# Patient Record
Sex: FEMALE | Race: WHITE | NOT HISPANIC OR LATINO | URBAN - METROPOLITAN AREA
[De-identification: names, ages, dates, MRNs, and addresses within clinical notes are randomized per-mention and may not be internally consistent; named-entity substitution may affect disease eponyms.]

---

## 2019-03-14 ENCOUNTER — OFFICE VISIT (OUTPATIENT)
Dept: OBSTETRICS AND GYNECOLOGY | Facility: CLINIC | Age: 24
End: 2019-03-14

## 2019-03-14 VITALS
BODY MASS INDEX: 45.71 KG/M2 | DIASTOLIC BLOOD PRESSURE: 68 MMHG | HEIGHT: 63 IN | WEIGHT: 258 LBS | SYSTOLIC BLOOD PRESSURE: 120 MMHG

## 2019-03-14 DIAGNOSIS — E66.01 CLASS 3 SEVERE OBESITY DUE TO EXCESS CALORIES WITHOUT SERIOUS COMORBIDITY WITH BODY MASS INDEX (BMI) OF 45.0 TO 49.9 IN ADULT (HCC): Primary | ICD-10-CM

## 2019-03-14 PROBLEM — E66.813 CLASS 3 SEVERE OBESITY WITHOUT SERIOUS COMORBIDITY WITH BODY MASS INDEX (BMI) OF 45.0 TO 49.9 IN ADULT: Status: ACTIVE | Noted: 2019-03-14

## 2019-03-14 PROCEDURE — 99204 OFFICE O/P NEW MOD 45 MIN: CPT | Performed by: OBSTETRICS & GYNECOLOGY

## 2019-03-14 RX ORDER — FLUOXETINE HYDROCHLORIDE 40 MG/1
CAPSULE ORAL
COMMUNITY
Start: 2019-03-09

## 2019-03-14 RX ORDER — HYDROXYZINE PAMOATE 25 MG/1
25 CAPSULE ORAL CONTINUOUS PRN
COMMUNITY

## 2019-03-14 NOTE — PROGRESS NOTES
Subjective     Chief Complaint   Patient presents with   • Gynecologic Exam     Patient states she was told by [facility] that she has symptoms of PCOS.  Patient had recent pap smear in February (normal).  Patient states  menses short at end of pill pack.  On ocp x 1 year.  Menses more regular since start of ocp's        Ashely Serrano is a 23 y.o. year old  presenting to be seen for consultation.    Her LMP was Patient's last menstrual period was 2019 (approximate)..  Her cycles are regular, predictable and consistent every 28 - 32 days.  Usually her flow is typically normal with no more than 0 days of heavy flow each menses.   Additionally she describes no other symptoms associated with her menses.    She is sexually active.  In the past 12 months there have not been new sexual partners.  Condoms are not typically used.  She would not like to be screened for STD's at today's exam.     Current contraceptive methods being used: OCP (estrogen/progesterone).  In the coming year, she is not considering changing her current contraceptive method.    She exercises regularly: no.  She wears her seat belt: not asked.  She has concerns about domestic violence: no.    GYN screening history:  · Last pap: she reports her last PAP was normal.    This patient is concerned about polycystic ovarian syndrome.  She had menarche at age 13.  And has had times in her life where she would have regular menstrual cycles and times when they would be irregular.  She is morbidly obese with a BMI of 45.7.  We discussed the pathophysiology of polycystic ovarian syndrome and the primal concept of chronic anovulation.  We discussed the morphologic changes in the ovaries associated with long-term chronic anovulation and the ultrasound configuration of the ovaries which has led itself to support the diagnosis of polycystic ovarian syndrome.  We discussed the impact of the lack of cyclic hormones on the androgenic effects that are  "seen in individuals with polycystic ovarian syndrome.  We discussed the concept of infertility due to anovulation.  We discussed the role of insulin resistance and obesity with polycystic ovarian syndrome.  Ultimately we discussed the approaches to treatment of polycystic ovarian syndrome and focusing those treatments on both the patient's symptoms as well as desire for fertility.  Ultimately this patient has very few stigmata of polycystic ovarian syndrome and does not wish to become pregnant at this time.    The following portions of the patient's history were reviewed and updated as appropriate:vital signs, allergies, current medications, past medical history, past social history, past surgical history and problem list.    Review of Systems  Pertinent items are noted in HPI.     Physical Exam    Objective     /68   Ht 160 cm (63\")   Wt 117 kg (258 lb)   LMP 02/25/2019 (Approximate)   Breastfeeding? No   BMI 45.70 kg/m²       General:  well developed; well nourished  no acute distress  obese - Body mass index is 45.7 kg/m².   Constitutional: obese and healthy   Skin:  Not performed.   Thyroid: not examined   Lungs:  Not performed.   Heart:  Not performed.   Breasts:  Not performed.   Abdomen: Not performed.   Pelvis: Not performed.   Musculoskeletal: negative   Neuro: Grossly normal   Psych: oriented to time, place and person, mood and affect are within normal limits, pt is a good historian; no memory problems were noted       Lab Review   No data reviewed    Imaging  No data reviewed    Assessment/Plan     ASSESSMENT  1. Class 3 severe obesity due to excess calories without serious comorbidity with body mass index (BMI) of 45.0 to 49.9 in adult (CMS/HCC)       2.     Concern for polycystic ovarian syndrom  PLAN    Continue OCP    Follow up: 1 year(s)      I personally spent 30 mins  of a total 45 minutes face to face with the patient in counseling and discussion and/or coordination of care and " education regarding PCOS. This patient is concerned about polycystic ovarian syndrome.  She had menarche at age 13.  And has had times in her life where she would have regular menstrual cycles and times when they would be irregular.  She is morbidly obese with a BMI of 45.7.  We discussed the pathophysiology of polycystic ovarian syndrome and the primal concept of chronic anovulation.  We discussed the morphologic changes in the ovaries associated with long-term chronic anovulation and the ultrasound configuration of the ovaries which has led itself to support the diagnosis of polycystic ovarian syndrome.  We discussed the impact of the lack of cyclic hormones on the androgenic effects that are seen in individuals with polycystic ovarian syndrome.  We discussed the concept of infertility due to anovulation.  We discussed the role of insulin resistance and obesity with polycystic ovarian syndrome.  Ultimately we discussed the approaches to treatment of polycystic ovarian syndrome and focusing those treatments on both the patient's symptoms as well as desire for fertility.  Ultimately this patient has very few stigmata of polycystic ovarian syndrome and does not wish to    This note was electronically signed.    Noble Jacobs MD  March 14, 2019

## 2024-07-05 ENCOUNTER — HOSPITAL ENCOUNTER (EMERGENCY)
Facility: HOSPITAL | Age: 29
Discharge: HOME | End: 2024-07-05
Attending: STUDENT IN AN ORGANIZED HEALTH CARE EDUCATION/TRAINING PROGRAM
Payer: MEDICAID

## 2024-07-05 ENCOUNTER — APPOINTMENT (OUTPATIENT)
Dept: CT IMAGING | Facility: HOSPITAL | Age: 29
End: 2024-07-05
Payer: MEDICAID

## 2024-07-05 VITALS
DIASTOLIC BLOOD PRESSURE: 80 MMHG | HEIGHT: 63 IN | SYSTOLIC BLOOD PRESSURE: 137 MMHG | BODY MASS INDEX: 47.84 KG/M2 | TEMPERATURE: 98.3 F | WEIGHT: 270 LBS | RESPIRATION RATE: 18 BRPM | OXYGEN SATURATION: 98 % | HEART RATE: 76 BPM

## 2024-07-05 DIAGNOSIS — I88.0 MESENTERIC ADENITIS: Primary | ICD-10-CM

## 2024-07-05 LAB
ALBUMIN SERPL-MCNC: 3.8 G/DL (ref 3.5–5.2)
ALBUMIN/GLOB SERPL: 1.2 G/DL
ALP SERPL-CCNC: 55 U/L (ref 39–117)
ALT SERPL W P-5'-P-CCNC: 22 U/L (ref 1–33)
ANION GAP SERPL CALCULATED.3IONS-SCNC: 13.1 MMOL/L (ref 5–15)
AST SERPL-CCNC: 17 U/L (ref 1–32)
B-HCG UR QL: NEGATIVE
BACTERIA UR QL AUTO: ABNORMAL /HPF
BASOPHILS # BLD AUTO: 0.02 10*3/MM3 (ref 0–0.2)
BASOPHILS NFR BLD AUTO: 0.1 % (ref 0–1.5)
BILIRUB SERPL-MCNC: 0.3 MG/DL (ref 0–1.2)
BILIRUB UR QL STRIP: NEGATIVE
BUN SERPL-MCNC: 6 MG/DL (ref 6–20)
BUN/CREAT SERPL: 9.4 (ref 7–25)
CALCIUM SPEC-SCNC: 9 MG/DL (ref 8.6–10.5)
CHLORIDE SERPL-SCNC: 105 MMOL/L (ref 98–107)
CLARITY UR: CLEAR
CO2 SERPL-SCNC: 20.9 MMOL/L (ref 22–29)
COLOR UR: ABNORMAL
CREAT SERPL-MCNC: 0.64 MG/DL (ref 0.57–1)
DEPRECATED RDW RBC AUTO: 38.5 FL (ref 37–54)
EGFRCR SERPLBLD CKD-EPI 2021: 123.6 ML/MIN/1.73
EOSINOPHIL # BLD AUTO: 0 10*3/MM3 (ref 0–0.4)
EOSINOPHIL NFR BLD AUTO: 0 % (ref 0.3–6.2)
ERYTHROCYTE [DISTWIDTH] IN BLOOD BY AUTOMATED COUNT: 12.8 % (ref 12.3–15.4)
GLOBULIN UR ELPH-MCNC: 3.3 GM/DL
GLUCOSE SERPL-MCNC: 103 MG/DL (ref 65–99)
GLUCOSE UR STRIP-MCNC: NEGATIVE MG/DL
HCT VFR BLD AUTO: 38.9 % (ref 34–46.6)
HGB BLD-MCNC: 13.3 G/DL (ref 12–15.9)
HGB UR QL STRIP.AUTO: NEGATIVE
HOLD SPECIMEN: NORMAL
HOLD SPECIMEN: NORMAL
HYALINE CASTS UR QL AUTO: ABNORMAL /LPF
IMM GRANULOCYTES # BLD AUTO: 0.07 10*3/MM3 (ref 0–0.05)
IMM GRANULOCYTES NFR BLD AUTO: 0.5 % (ref 0–0.5)
KETONES UR QL STRIP: NEGATIVE
LEUKOCYTE ESTERASE UR QL STRIP.AUTO: NEGATIVE
LIPASE SERPL-CCNC: 15 U/L (ref 13–60)
LYMPHOCYTES # BLD AUTO: 2.03 10*3/MM3 (ref 0.7–3.1)
LYMPHOCYTES NFR BLD AUTO: 13.4 % (ref 19.6–45.3)
MCH RBC QN AUTO: 28 PG (ref 26.6–33)
MCHC RBC AUTO-ENTMCNC: 34.2 G/DL (ref 31.5–35.7)
MCV RBC AUTO: 81.9 FL (ref 79–97)
MONOCYTES # BLD AUTO: 0.99 10*3/MM3 (ref 0.1–0.9)
MONOCYTES NFR BLD AUTO: 6.5 % (ref 5–12)
NEUTROPHILS NFR BLD AUTO: 12.04 10*3/MM3 (ref 1.7–7)
NEUTROPHILS NFR BLD AUTO: 79.5 % (ref 42.7–76)
NITRITE UR QL STRIP: NEGATIVE
NRBC BLD AUTO-RTO: 0 /100 WBC (ref 0–0.2)
PH UR STRIP.AUTO: 6 [PH] (ref 5–8)
PLATELET # BLD AUTO: 311 10*3/MM3 (ref 140–450)
PMV BLD AUTO: 11 FL (ref 6–12)
POTASSIUM SERPL-SCNC: 3.8 MMOL/L (ref 3.5–5.2)
PROT SERPL-MCNC: 7.1 G/DL (ref 6–8.5)
PROT UR QL STRIP: ABNORMAL
RBC # BLD AUTO: 4.75 10*6/MM3 (ref 3.77–5.28)
RBC # UR STRIP: ABNORMAL /HPF
REF LAB TEST METHOD: ABNORMAL
SODIUM SERPL-SCNC: 139 MMOL/L (ref 136–145)
SP GR UR STRIP: >=1.03 (ref 1–1.03)
SQUAMOUS #/AREA URNS HPF: ABNORMAL /HPF
UROBILINOGEN UR QL STRIP: ABNORMAL
WBC # UR STRIP: ABNORMAL /HPF
WBC NRBC COR # BLD AUTO: 15.15 10*3/MM3 (ref 3.4–10.8)
WHOLE BLOOD HOLD COAG: NORMAL
WHOLE BLOOD HOLD SPECIMEN: NORMAL

## 2024-07-05 PROCEDURE — 85025 COMPLETE CBC W/AUTO DIFF WBC: CPT | Performed by: STUDENT IN AN ORGANIZED HEALTH CARE EDUCATION/TRAINING PROGRAM

## 2024-07-05 PROCEDURE — 25510000001 IOPAMIDOL 61 % SOLUTION: Performed by: STUDENT IN AN ORGANIZED HEALTH CARE EDUCATION/TRAINING PROGRAM

## 2024-07-05 PROCEDURE — 81001 URINALYSIS AUTO W/SCOPE: CPT | Performed by: STUDENT IN AN ORGANIZED HEALTH CARE EDUCATION/TRAINING PROGRAM

## 2024-07-05 PROCEDURE — 81025 URINE PREGNANCY TEST: CPT | Performed by: STUDENT IN AN ORGANIZED HEALTH CARE EDUCATION/TRAINING PROGRAM

## 2024-07-05 PROCEDURE — 99285 EMERGENCY DEPT VISIT HI MDM: CPT

## 2024-07-05 PROCEDURE — 80053 COMPREHEN METABOLIC PANEL: CPT | Performed by: STUDENT IN AN ORGANIZED HEALTH CARE EDUCATION/TRAINING PROGRAM

## 2024-07-05 PROCEDURE — 83690 ASSAY OF LIPASE: CPT | Performed by: STUDENT IN AN ORGANIZED HEALTH CARE EDUCATION/TRAINING PROGRAM

## 2024-07-05 PROCEDURE — 74177 CT ABD & PELVIS W/CONTRAST: CPT

## 2024-07-05 RX ORDER — SODIUM CHLORIDE 0.9 % (FLUSH) 0.9 %
10 SYRINGE (ML) INJECTION AS NEEDED
Status: DISCONTINUED | OUTPATIENT
Start: 2024-07-05 | End: 2024-07-05 | Stop reason: HOSPADM

## 2024-07-05 RX ADMIN — IOPAMIDOL 100 ML: 612 INJECTION, SOLUTION INTRAVENOUS at 12:34

## 2024-07-05 NOTE — ED PROVIDER NOTES
[facility] EMERGENCY DEPARTMENT  Emergency Department Encounter  Emergency Medicine Physician Note       Pt Name: Ashely Serrano  MRN: [id number]  Pt :   1995  Room Number:    Date of encounter:  2024  PCP: Darcie Aragon APRN  ED Provider: Jacky Chen MD    Historian: Patient      HPI:  Chief Complaint: Abdominal pain        Context: Ashely Serrano is a 29 y.o. female who presents to the ED for abdominal pain.  Patient reports right lower quadrant abdominal pain starting this morning.  Patient states she has a history of ovarian cyst and felt similar to previous episodes.  She reports nausea but no vomiting.  No fevers or chills.  She denies changes to her stools.  No vaginal bleeding or abnormal discharge.  She does report some pain with urination on the right side.  She states she is on birth control pills and reports compliance.      PAST MEDICAL HISTORY  Past Medical History:   Diagnosis Date    Anxiety     Depression          PAST SURGICAL HISTORY  Past Surgical History:   Procedure Laterality Date    ANKLE SURGERY Right          FAMILY HISTORY  Family History   Problem Relation Age of Onset    Stroke Mother     Heart disease Father          SOCIAL HISTORY  Social History     Socioeconomic History    Marital status: Unknown   Tobacco Use    Smoking status: Every Day     Current packs/day: 0.25     Types: Electronic Cigarette, Cigarettes    Tobacco comments:     also a vaper   Substance and Sexual Activity    Alcohol use: No    Drug use: No    Sexual activity: Yes     Partners: Male     Birth control/protection: Condom, OCP         ALLERGIES  Penicillins        REVIEW OF SYSTEMS  Systems reviewed and negative      PHYSICAL EXAM    I have reviewed the triage vital signs and nursing notes.    ED Triage Vitals [24 1024]   Temp Heart Rate Resp BP SpO2   98.3 °F (36.8 °C) 74 18 146/82 96 %      Temp src Heart Rate Source Patient Position BP Location FiO2 (%)   Oral  Monitor -- -- --       Physical Exam  Constitutional:       General: She is not in acute distress.     Appearance: She is not ill-appearing.   HENT:      Head: Normocephalic and atraumatic.   Eyes:      Extraocular Movements: Extraocular movements intact.   Cardiovascular:      Rate and Rhythm: Normal rate.      Pulses: Normal pulses.   Pulmonary:      Effort: Pulmonary effort is normal.   Abdominal:      General: There is no distension.      Palpations: Abdomen is soft.      Tenderness: There is abdominal tenderness in the right lower quadrant.   Musculoskeletal:         General: No deformity.      Cervical back: Neck supple.   Skin:     General: Skin is warm.   Neurological:      General: No focal deficit present.      Mental Status: She is alert.         LAB RESULTS  Recent Results (from the past 24 hour(s))   Urinalysis With Microscopic If Indicated (No Culture) - Urine, Clean Catch    Collection Time: 07/05/24 10:36 AM    Specimen: Urine, Clean Catch   Result Value Ref Range    Color, UA Dark Yellow (A) Yellow, Straw    Appearance, UA Clear Clear    pH, UA 6.0 5.0 - 8.0    Specific Gravity, UA >=1.030 1.005 - 1.030    Glucose, UA Negative Negative    Ketones, UA Negative Negative    Bilirubin, UA Negative Negative    Blood, UA Negative Negative    Protein, UA 30 mg/dL (1+) (A) Negative    Leuk Esterase, UA Negative Negative    Nitrite, UA Negative Negative    Urobilinogen, UA 0.2 E.U./dL 0.2 - 1.0 E.U./dL   Pregnancy, Urine - Urine, Clean Catch    Collection Time: 07/05/24 10:36 AM    Specimen: Urine, Clean Catch   Result Value Ref Range    HCG, Urine QL Negative Negative   Urinalysis, Microscopic Only - Urine, Clean Catch    Collection Time: 07/05/24 10:36 AM    Specimen: Urine, Clean Catch   Result Value Ref Range    RBC, UA 0-2 None Seen, 0-2 /HPF    WBC, UA 3-5 (A) None Seen, 0-2 /HPF    Bacteria, UA 1+ (A) None Seen /HPF    Squamous Epithelial Cells, UA 13-20 (A) None Seen, 0-2 /HPF    Hyaline Casts, UA  None Seen None Seen /LPF    Methodology Manual Light Microscopy    Comprehensive Metabolic Panel    Collection Time: 07/05/24 10:45 AM    Specimen: Blood   Result Value Ref Range    Glucose 103 (H) 65 - 99 mg/dL    BUN 6 6 - 20 mg/dL    Creatinine 0.64 0.57 - 1.00 mg/dL    Sodium 139 136 - 145 mmol/L    Potassium 3.8 3.5 - 5.2 mmol/L    Chloride 105 98 - 107 mmol/L    CO2 20.9 (L) 22.0 - 29.0 mmol/L    Calcium 9.0 8.6 - 10.5 mg/dL    Total Protein 7.1 6.0 - 8.5 g/dL    Albumin 3.8 3.5 - 5.2 g/dL    ALT (SGPT) 22 1 - 33 U/L    AST (SGOT) 17 1 - 32 U/L    Alkaline Phosphatase 55 39 - 117 U/L    Total Bilirubin 0.3 0.0 - 1.2 mg/dL    Globulin 3.3 gm/dL    A/G Ratio 1.2 g/dL    BUN/Creatinine Ratio 9.4 7.0 - 25.0    Anion Gap 13.1 5.0 - 15.0 mmol/L    eGFR 123.6 >60.0 mL/min/1.73   Lipase    Collection Time: 07/05/24 10:45 AM    Specimen: Blood   Result Value Ref Range    Lipase 15 13 - 60 U/L   Green Top (Gel)    Collection Time: 07/05/24 10:45 AM   Result Value Ref Range    Extra Tube Hold for add-ons.    Lavender Top    Collection Time: 07/05/24 10:45 AM   Result Value Ref Range    Extra Tube hold for add-on    Gold Top - SST    Collection Time: 07/05/24 10:45 AM   Result Value Ref Range    Extra Tube Hold for add-ons.    Light Blue Top    Collection Time: 07/05/24 10:45 AM   Result Value Ref Range    Extra Tube Hold for add-ons.    CBC Auto Differential    Collection Time: 07/05/24 10:45 AM    Specimen: Blood   Result Value Ref Range    WBC 15.15 (H) 3.40 - 10.80 10*3/mm3    RBC 4.75 3.77 - 5.28 10*6/mm3    Hemoglobin 13.3 12.0 - 15.9 g/dL    Hematocrit 38.9 34.0 - 46.6 %    MCV 81.9 79.0 - 97.0 fL    MCH 28.0 26.6 - 33.0 pg    MCHC 34.2 31.5 - 35.7 g/dL    RDW 12.8 12.3 - 15.4 %    RDW-SD 38.5 37.0 - 54.0 fl    MPV 11.0 6.0 - 12.0 fL    Platelets 311 140 - 450 10*3/mm3    Neutrophil % 79.5 (H) 42.7 - 76.0 %    Lymphocyte % 13.4 (L) 19.6 - 45.3 %    Monocyte % 6.5 5.0 - 12.0 %    Eosinophil % 0.0 (L) 0.3 - 6.2 %     Basophil % 0.1 0.0 - 1.5 %    Immature Grans % 0.5 0.0 - 0.5 %    Neutrophils, Absolute 12.04 (H) 1.70 - 7.00 10*3/mm3    Lymphocytes, Absolute 2.03 0.70 - 3.10 10*3/mm3    Monocytes, Absolute 0.99 (H) 0.10 - 0.90 10*3/mm3    Eosinophils, Absolute 0.00 0.00 - 0.40 10*3/mm3    Basophils, Absolute 0.02 0.00 - 0.20 10*3/mm3    Immature Grans, Absolute 0.07 (H) 0.00 - 0.05 10*3/mm3    nRBC 0.0 0.0 - 0.2 /100 WBC       If labs were ordered, I independently reviewed the results and considered them in treating the patient.        RADIOLOGY  XR Chest 1 View    Result Date: 7/4/2024  PORTABLE CHEST HISTORY: Shortness of breath. COMPARISON: 6/28/2024. FINDINGS: The heart is normal in size. The mediastinum is unremarkable. The lungs are clear. There is no pneumothorax.    No acute cardiopulmonary process. Images reviewed, interpreted, and dictated by Dr. Aaron Arechiga. Transcribed by Alec Metcalf PA-C.     PROCEDURES    Procedures    No orders to display       MEDICATIONS GIVEN IN ER    Medications   sodium chloride 0.9 % flush 10 mL (has no administration in time range)         MEDICAL DECISION MAKING, PROGRESS, and CONSULTS    All labs, if obtained, have been independently reviewed by me.  All radiology studies, if obtained, have been reviewed by me and the radiologist dictating the report.  All EKG's, if obtained, have been independently viewed and interpreted by me.      Discussion below represents my analysis of pertinent findings related to patient's condition, differential diagnosis, treatment plan and final disposition.                         Differential diagnosis:    ***, others.      Additional sources:    - Discussed/ obtained information from independent historians:  ***    - External (non-ED) record review: Outside ED note from yesterday [facility]    - Chronic or social conditions impacting care:  ***    - Shared decision making:  ***      Orders placed during this visit:  Orders Placed This Encounter    Procedures    CT Abdomen Pelvis With Contrast    [place] Draw    Comprehensive Metabolic Panel    Lipase    Urinalysis With Microscopic If Indicated (No Culture) - Urine, Clean Catch    Pregnancy, Urine - Urine, Clean Catch    CBC Auto Differential    Urinalysis, Microscopic Only - Urine, Clean Catch    NPO Diet NPO Type: Strict NPO    Undress & Gown    Insert Peripheral IV    CBC & Differential    Green Top (Gel)    Lavender Top    Gold Top - SST    Light Blue Top         Additional orders considered but not ordered:  ***    ED Course/MDM Discussion:    ***                Consultants:    ***Hospitalist***    Shared Decision Making:  After my consideration of clinical presentation and any laboratory/radiology studies obtained, I discussed the findings with the patient/patient representative who is in agreement with the treatment plan and the final disposition.   Risks and benefits of discharge and/or observation/admission were discussed.   ***      AS OF 11:28 EDT VITALS:    BP - 146/82  HR - 74  TEMP - 98.3 °F (36.8 °C) (Oral)  O2 SATS - 96%                  DIAGNOSIS  Final diagnoses:   None         DISPOSITION  ED Disposition       None                Please note that portions of this document were completed with voice recognition software.

## 2025-01-10 ENCOUNTER — HOSPITAL ENCOUNTER (EMERGENCY)
Facility: HOSPITAL | Age: 30
Discharge: HOME | End: 2025-01-10
Attending: EMERGENCY MEDICINE
Payer: OTHER MISCELLANEOUS

## 2025-01-10 ENCOUNTER — APPOINTMENT (OUTPATIENT)
Dept: GENERAL RADIOLOGY | Facility: HOSPITAL | Age: 30
End: 2025-01-10
Payer: OTHER MISCELLANEOUS

## 2025-01-10 VITALS
WEIGHT: 272 LBS | HEART RATE: 80 BPM | OXYGEN SATURATION: 100 % | TEMPERATURE: 97.9 F | HEIGHT: 64 IN | RESPIRATION RATE: 18 BRPM | BODY MASS INDEX: 46.44 KG/M2 | SYSTOLIC BLOOD PRESSURE: 149 MMHG | DIASTOLIC BLOOD PRESSURE: 86 MMHG
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
## 2025-01-10 DIAGNOSIS — S50.12XA CONTUSION OF LEFT FOREARM, INITIAL ENCOUNTER: Primary | ICD-10-CM

## 2025-01-10 PROCEDURE — 99283 EMERGENCY DEPT VISIT LOW MDM: CPT

## 2025-01-10 PROCEDURE — 73090 X-RAY EXAM OF FOREARM: CPT

## 2025-01-10 RX ORDER — ACETAMINOPHEN 500 MG
1000 TABLET ORAL ONCE
Status: COMPLETED | OUTPATIENT
Start: 2025-01-10 | End: 2025-01-10

## 2025-01-10 RX ORDER — METHOCARBAMOL 750 MG/1
750 TABLET, FILM COATED ORAL ONCE
Status: COMPLETED | OUTPATIENT
Start: 2025-01-10 | End: 2025-01-10

## 2025-01-10 RX ADMIN — ACETAMINOPHEN 1000 MG: 500 TABLET, FILM COATED ORAL at 01:46

## 2025-01-10 RX ADMIN — METHOCARBAMOL 750 MG: 750 TABLET ORAL at 01:46

## 2025-01-10 NOTE — Clinical Note
[facility] EMERGENCY DEPARTMENT  [address]  [facility] [address]  Phone: [phone]    Ashely Serrano was seen and treated in our emergency department on 1/10/2025.  She may return to work on 01/12/2025.         Thank you for choosing [facility].    Saul Sunshine MD

## 2025-01-10 NOTE — ED PROVIDER NOTES
Subjective   History of Present Illness  Is a 29-year-old female with a history of anxiety presenting to the emergency department with some left forearm pain.  Patient states that she slipped and fell while at work.  She slipped on some ice.  She landed on her left arm.  She complained of some pain on her upper forearm.  She states that hurts when she tries to move it or pushes on it.  She did not sustain any other injuries.  No loss consciousness.  No headache or change in vision.  No focal weakness.  No chest pain or shortness of breath    History provided by:  Patient   used: No        Review of Systems   Constitutional: Negative.    Respiratory: Negative.     Musculoskeletal:  Positive for myalgias.   Neurological: Negative.        Past Medical History:   Diagnosis Date    Anxiety     Depression        Allergies   Allergen Reactions    Penicillins        Past Surgical History:   Procedure Laterality Date    ANKLE SURGERY Right        Family History   Problem Relation Age of Onset    Stroke Mother     Heart disease Father        Social History     Socioeconomic History    Marital status: Single   Tobacco Use    Smoking status: Every Day     Current packs/day: 0.25     Types: Electronic Cigarette, Cigarettes    Tobacco comments:     also a vaper   Substance and Sexual Activity    Alcohol use: No    Drug use: No    Sexual activity: Yes     Partners: Male     Birth control/protection: Condom, OCP           Objective   Physical Exam  Vitals and nursing note reviewed.   Constitutional:       General: She is not in acute distress.     Appearance: She is not ill-appearing or toxic-appearing.   Cardiovascular:      Pulses: Normal pulses.   Musculoskeletal:      Comments: Patient is some tenderness to palpation in the proximal third of the left forearm.  There is no obvious deformity.  Range of motion of the elbow and wrist are intact.  Compartment soft.  Neurovascular intact   Neurological:       General: No focal deficit present.      Mental Status: She is alert.         Procedures           ED Course  ED Course as of 01/10/25 0229   Fri Dinh 10, 2025   0147 BP: 149/86 [JK]   0148 Noninvasive MAP (mmHg): 115 [JK]   0148 Temp: 97.9 °F (36.6 °C) [JK]   0148 Temp src: Oral [JK]   0148 Heart Rate: 69 [JK]   0148 Resp: 18 [JK]   0148 SpO2: 97 % [JK]   0148 Device (Oxygen Therapy): room air  Interpretation:  Patient's vitals were directly viewed and interpreted by myself.   O2 sat 97% on room air, interpreted as normal  Telemetry revealed a rate of 69 bpm, interpreted as normal sinus rhythm [JK]   0228 XR Forearm 2 View Left  Interpretation:  Imaging was directly visualized by myself, per my interpretations, x-ray of the left forearm is unremarkable. [JK]   0228 On reevaluation, the patient's pain is improved.  Range of motion appears intact.  Repeat examination does not show any evidence of compartment syndrome or neurovascular compromise.  Patient is given care instructions for the injury.  Extremities to be propped up and elevated.  They are to ice injury for 15 minutes, to avoid any frostbite.     [JK]   0228 I had a discussion with the patient/family regarding diagnosis, diagnostic results, treatment plan, and medications. The patient/family indicated understanding of these instructions. I spent adequate time at the bedside prior to discharge necessary to discuss the aftercare instructions, giving patient education, providing explanations of the results of our evaluations/findings, and my decision making to assure that the patient/family understand the plan of care. Time was allotted to answer questions at that time and throughout the ED course. Patient is required to maintain timely follow up, as discussed. I also discussed the potential for the development of an acute emergent condition requiring further evaluation, return to the ER, admission, or even surgical intervention.  I encouraged the patient to  return to the emergency department immediately for any concerns, worsening symptoms, new complaints, or if symptoms persist and they are unable to seek follow-up in a timely fashion. The patient/family expressed understanding and agreement with this plan    Shared decision making:   After full review of the patient's clinical presentation, review of any work-up including but not limited to laboratory studies and radiology obtained, I had a discussion with the patient.  Treatment options were discussed as well as the risks, benefits and consequences.  I discussed all findings with the patient and family members if available.  During the discussion, treatment goals were understood by all as well as any misconceptions which were addressed with the patient.  Ample time was given for any questions they may have had.  They are in agreement with the treatment plan as well as final disposition. [JK]      ED Course User Index  [JK] Saul Sunshine MD                                                       Medical Decision Making  This is a 29-year-old female presenting to the emergency department with some left forearm pain.  The patient had a slip and fall on ice at work.  Findings seem most consistent with a sprain or contusion.  There is no neurovascular compromise.  Patient [place] analgesics.  Imaging obtained.      Differential diagnosis: Left forearm sprain, strain, contusion, fracture, dislocation, hematoma    Amount and/or Complexity of Data Reviewed  External Data Reviewed: radiology and notes.     Details: External laboratories, imaging as well as notes were reviewed personally by myself.  All relevant studies were used to guide decision making.     Date of previous record: 1/9/2025    Source of note: [facility]    Summary: Patient was seen for similar symptoms.  I did review x-ray of the left wrist and forearm.  Records reviewed    Radiology: ordered and independent interpretation performed. Decision-making  details documented in ED Course.    Risk  OTC drugs.  Prescription drug management.        Final diagnoses:   Contusion of left forearm, initial encounter   Accidental fall, initial encounter       ED Disposition  ED Disposition       ED Disposition   Discharge    Condition   Stable    Comment   --               Darcie Aragon, YA  [address] [place], [place] [id number] [phone]    Call in 1 day           Medication List      No changes were made to your prescriptions during this visit.            Saul Sunshine MD  01/10/25 0229

## 2025-02-03 ENCOUNTER — OFFICE VISIT (OUTPATIENT)
Dept: ORTHOPEDIC SURGERY | Facility: CLINIC | Age: 30
End: 2025-02-03
Payer: OTHER MISCELLANEOUS

## 2025-02-03 ENCOUNTER — TELEPHONE (OUTPATIENT)
Dept: ORTHOPEDIC SURGERY | Facility: CLINIC | Age: 30
End: 2025-02-03

## 2025-02-03 VITALS
SYSTOLIC BLOOD PRESSURE: 130 MMHG | BODY MASS INDEX: 46.44 KG/M2 | WEIGHT: 272 LBS | DIASTOLIC BLOOD PRESSURE: 90 MMHG | HEIGHT: 64 IN

## 2025-02-03 DIAGNOSIS — M25.522 LEFT ELBOW PAIN: Primary | ICD-10-CM

## 2025-02-03 RX ORDER — BUDESONIDE AND FORMOTEROL FUMARATE DIHYDRATE 160; 4.5 UG/1; UG/1
AEROSOL RESPIRATORY (INHALATION)
COMMUNITY

## 2025-02-03 RX ORDER — ALBUTEROL SULFATE 90 UG/1
AEROSOL, METERED RESPIRATORY (INHALATION)
COMMUNITY
Start: 2024-10-25

## 2025-02-03 RX ORDER — BUSPIRONE HYDROCHLORIDE 15 MG/1
0.5 TABLET ORAL EVERY 12 HOURS SCHEDULED
COMMUNITY
Start: 2025-01-11

## 2025-02-03 NOTE — PROGRESS NOTES
[facility] Orthopedic     Office Visit       Date: 02/03/2025   Patient Name: Ashely Serrano  MRN: [id number]  YOB: 1995    Referring Physician: Gaurang Francois MD     Chief Complaint:   Chief Complaint   Patient presents with    Left Elbow - Pain, Initial Evaluation     DOI 01/10/2025     History of Present Illness:   Ashely Serrano is a 29 y.o. female right-hand-dominant presented clinic as new patient complaints of left elbow pain.  Patient sustained a work-related injury after a fall on ice, DOI 1/9/2025.  She was evaluated in the emergency department fractures were obtained demonstrating no fractures.  She followed up with occupational medicine for repeat elbow x-rays were obtained demonstrate no fractures.  She presents today for follow-up.  She has been off work since the injury as they have been unable to accommodate her work restrictions of no push pull lifting greater than 2 pounds.  She endorses 5/10 pain.  She has not tempted any therapy or anti-inflammatories.  No other complaints or concerns.    PMH: Anxiety and depression    Subjective   Review of Systems:   Review of Systems   Constitutional: Negative.  Negative for chills, fatigue and fever.   HENT: Negative.  Negative for congestion and dental problem.    Eyes: Negative.  Negative for blurred vision.   Respiratory: Negative.  Negative for shortness of breath.    Cardiovascular: Negative.  Negative for leg swelling.   Gastrointestinal: Negative.  Negative for abdominal pain.   Endocrine: Negative.  Negative for polyuria.   Genitourinary: Negative.  Negative for difficulty urinating.   Musculoskeletal:  Positive for arthralgias.   Skin: Negative.    Allergic/Immunologic: Negative.    Neurological: Negative.    Hematological: Negative.  Negative for adenopathy.   Psychiatric/Behavioral: Negative.  Negative for behavioral problems.       Pertinent review of systems  "per HPI    I reviewed the patient's chief complaint, history of present illness, review of systems, past medical history, surgical history, family history, social history, medications and allergy list in the EMR on 02/03/2025 and agree with the findings above.    Objective    Vital Signs:   Vitals:    02/03/25 1015   BP: 130/90   Weight: 123 kg (272 lb)   Height: 162.6 cm (64.02\")     General: No acute distress. Alert and oriented.   Cardiovascular: Palpable radial pulse.   Respiratory: Breathing is nonlabored.   Ortho Exam:  Examination left upper extremity demonstrates no deformity.  No skin lesions or abrasions.  She is nontender over the capitellum, radial head, radiocapitellar joint.  Mildly tender diffusely to the soft tissues proximal to the lateral epicondyle.  Mildly tender along the medial aspect of the elbow diffusely to deep and light palpation.  Patient has full elbow flexion, extension, pronation and supination.  No mechanical blocks to motion.  The elbow stable to varus and valgus stress.  She did make a full composite fist.  Sensation is intact to light touch of the hand.  Warm well-perfused distally.    Imaging / Studies:    Imaging Results (Last 24 Hours)       Procedure Component Value Units Date/Time    XR Elbow 3+ View Left [[id number]] Resulted: 02/03/25 1038     Updated: 02/03/25 1038    Narrative:      Left Elbow X-Ray    Indication: Pain    Views: AP, oblique and Lateral     Comparison:  Forearm xrays 1/10/25    Findings:  No acute fractures or dislocation. No bony lesions. Normal soft tissues.   Normal joint spaces.    Impression: No acute findings.                   Left forearm x-rays obtained on 1/10/2025 were personally reviewed and interpreted by myself.  These demonstrate no evidence of acute fracture or dislocation.    Left elbow x-rays obtained on 1/28/2025 images were not available for review, however the radiology report demonstrates no acute bone, joint, or soft tissue " abnormalities.  Assessment / Plan    Assessment/Plan:   Ashely Serrano is a 29 y.o. female with left elbow pain, status post work-related injury 1/9/2025.    I discussed with the patient their clinical and radiographic findings demonstrate elbow pain without fracture or dislocation.  We had a lengthy discussion regarding the pathophysiology of their diagnosis.  The patient's repeat x-rays today are negative as compared to the injury films.  Her exam is relatively benign.  From an orthopedic perspective there are no operative indications.  I provided her a therapy referral and advised anti-inflammatories as needed for pain.  She may return to work without restrictions.  She may follow-up with one of the PAs if symptoms worsen or fail to improve.  They were agreeable with the plan.  All questions and concerns were addressed.       ICD-10-CM ICD-9-CM   1. Left elbow pain  M25.522 719.42     Follow Up:   Return if symptoms worsen or fail to improve.      Whitley Fam MD  [facility] Orthopedic & Hand Surgeon

## 2025-02-03 NOTE — TELEPHONE ENCOUNTER
Caller: Ashely Serrano    Relationship: Self    Best call back number:     What form or medical record are you requesting: WORK RESTRICTIONS NOTE     Who is requesting this form or medical record from you: EMPLOYER     How would you like to receive the form or medical records (pick-up, mail, fax): EMAIL TO [email or web address]       Timeframe paperwork needed: ASAP    Additional notes: IF WE CAN'T EMAIL PLEASE CONTACT PATIENT

## 2025-02-03 NOTE — TELEPHONE ENCOUNTER
Provided patient with a work release note (no restrictions). I contacted her and she stated she will pick it up this afternoon. It will be at the front office.    Kellie Grady MA

## 2025-02-03 NOTE — LETTER
Care transitioned to TALIB,RN   February 3, 2025     Patient: Ashely Serrano   YOB: 1995   Date of Visit: 2/3/2025       To Whom It May Concern:    It is my medical opinion that Ashely Serrano may return to full duty immediately with no restrictions.           Sincerely,        Whitley Fam MD    CC: No Recipients

## 2025-07-18 ENCOUNTER — HOSPITAL ENCOUNTER (EMERGENCY)
Facility: HOSPITAL | Age: 30
Discharge: HOME | End: 2025-07-18
Attending: EMERGENCY MEDICINE
Payer: MEDICAID

## 2025-07-18 VITALS
DIASTOLIC BLOOD PRESSURE: 95 MMHG | HEART RATE: 91 BPM | WEIGHT: 265 LBS | HEIGHT: 64 IN | BODY MASS INDEX: 45.24 KG/M2 | TEMPERATURE: 98.8 F | RESPIRATION RATE: 18 BRPM | OXYGEN SATURATION: 97 % | SYSTOLIC BLOOD PRESSURE: 145 MMHG

## 2025-07-18 DIAGNOSIS — R82.71 GROUP B STREPTOCOCCAL BACTERIURIA: ICD-10-CM

## 2025-07-18 DIAGNOSIS — N39.0 ACUTE UTI (URINARY TRACT INFECTION): Primary | ICD-10-CM

## 2025-07-18 LAB
B-HCG UR QL: NEGATIVE
BACTERIA UR QL AUTO: ABNORMAL /HPF
BILIRUB UR QL STRIP: NEGATIVE
CLARITY UR: ABNORMAL
COLOR UR: ABNORMAL
EXPIRATION DATE: NORMAL
GLUCOSE UR STRIP-MCNC: NEGATIVE MG/DL
HGB UR QL STRIP.AUTO: NEGATIVE
HYALINE CASTS UR QL AUTO: ABNORMAL /LPF
INTERNAL NEGATIVE CONTROL: NEGATIVE
INTERNAL POSITIVE CONTROL: POSITIVE
KETONES UR QL STRIP: ABNORMAL
LEUKOCYTE ESTERASE UR QL STRIP.AUTO: ABNORMAL
Lab: NORMAL
MUCOUS THREADS URNS QL MICRO: ABNORMAL /HPF
NITRITE UR QL STRIP: NEGATIVE
PH UR STRIP.AUTO: 5.5 [PH] (ref 5–8)
PROT UR QL STRIP: ABNORMAL
RBC # UR STRIP: ABNORMAL /HPF
REF LAB TEST METHOD: ABNORMAL
SP GR UR STRIP: 1.03 (ref 1–1.03)
SQUAMOUS #/AREA URNS HPF: ABNORMAL /HPF
UROBILINOGEN UR QL STRIP: ABNORMAL
WBC # UR STRIP: ABNORMAL /HPF

## 2025-07-18 PROCEDURE — 81025 URINE PREGNANCY TEST: CPT | Performed by: EMERGENCY MEDICINE

## 2025-07-18 PROCEDURE — 81001 URINALYSIS AUTO W/SCOPE: CPT | Performed by: EMERGENCY MEDICINE

## 2025-07-18 PROCEDURE — 99283 EMERGENCY DEPT VISIT LOW MDM: CPT

## 2025-07-18 PROCEDURE — 87086 URINE CULTURE/COLONY COUNT: CPT | Performed by: EMERGENCY MEDICINE

## 2025-07-18 RX ORDER — PHENAZOPYRIDINE HYDROCHLORIDE 100 MG/1
200 TABLET, FILM COATED ORAL ONCE
Status: COMPLETED | OUTPATIENT
Start: 2025-07-18 | End: 2025-07-18

## 2025-07-18 RX ORDER — CEFUROXIME AXETIL 250 MG/1
500 TABLET ORAL ONCE
Status: COMPLETED | OUTPATIENT
Start: 2025-07-18 | End: 2025-07-18

## 2025-07-18 RX ORDER — PHENAZOPYRIDINE HYDROCHLORIDE 200 MG/1
200 TABLET, FILM COATED ORAL 3 TIMES DAILY PRN
Qty: 6 TABLET | Refills: 0 | Status: SHIPPED | OUTPATIENT
Start: 2025-07-18 | End: 2025-07-20

## 2025-07-18 RX ORDER — CEFUROXIME AXETIL 500 MG/1
500 TABLET ORAL 2 TIMES DAILY
Qty: 10 TABLET | Refills: 0 | Status: SHIPPED | OUTPATIENT
Start: 2025-07-18 | End: 2025-07-23

## 2025-07-18 RX ADMIN — CEFUROXIME AXETIL 500 MG: 250 TABLET, FILM COATED ORAL at 20:26

## 2025-07-18 RX ADMIN — PHENAZOPYRIDINE 200 MG: 100 TABLET ORAL at 20:26

## 2025-07-18 NOTE — ED PROVIDER NOTES
EMERGENCY DEPARTMENT ENCOUNTER    Pt Name: Ashely Serrano  MRN: [id number]  Pt :   1995  Room Number:  RW3/R3  Date of encounter:  2025  PCP: Darcie Aragon APRN  ED Provider: FAUSTO Sanchez    Historian: Patient    HPI:  Chief Complaint: Problems urinating and pain pelvic region and abdomen 1-2 weeks.      Context: Ashely Serrano is a 30 y.o. female who presents to the ED c/o 1-2 week history of urinary problems and pelvic and abdominal pain. The patient initially saw a primary care physician who diagnosed a UTI and referred for imaging. A CT scan was performed at [facility] ED. The primary care physician called the patient today reporting GBS found in the urine culture and referred to the emergency department as there was not much he could do. The patient reports burning with urination and pain that interferes with work activities. The patient denies fever but reports nausea, which is attributed to ongoing GI problems including constipation. Antibiotics were prescribed but the patient has not yet started them as they were planning to pick them up today.    HPI     REVIEW OF SYSTEMS  A chief complaint appropriate review of systems was completed and is negative except as noted in the HPI.     PAST MEDICAL HISTORY  Past Medical History:   Diagnosis Date    Anxiety     Depression        PAST SURGICAL HISTORY  Past Surgical History:   Procedure Laterality Date    ANKLE SURGERY Right        FAMILY HISTORY  Family History   Problem Relation Age of Onset    Stroke Mother     Heart disease Father        SOCIAL HISTORY  Social History     Socioeconomic History    Marital status: Single   Tobacco Use    Smoking status: Every Day     Current packs/day: 0.25     Types: Electronic Cigarette, Cigarettes    Tobacco comments:     also a vaper   Substance and Sexual Activity    Alcohol use: No    Drug use: No    Sexual activity: Yes     Partners: Male     Birth control/protection:  Condom, OCP       ALLERGIES  Penicillins    PHYSICAL EXAM  Physical Exam  Vitals and nursing note reviewed.   Constitutional:       General: She is not in acute distress.     Appearance: Normal appearance. She is not ill-appearing or toxic-appearing.   HENT:      Head: Normocephalic.      Nose: Nose normal.      Mouth/Throat:      Mouth: Mucous membranes are moist.   Eyes:      Extraocular Movements: Extraocular movements intact.   Cardiovascular:      Rate and Rhythm: Normal rate and regular rhythm.   Pulmonary:      Effort: Pulmonary effort is normal.   Abdominal:      General: There is no distension.      Palpations: Abdomen is soft.      Tenderness: There is no abdominal tenderness.   Musculoskeletal:         General: Normal range of motion.      Cervical back: Normal range of motion.   Skin:     General: Skin is warm and dry.   Neurological:      General: No focal deficit present.      Mental Status: She is alert.   Psychiatric:         Mood and Affect: Mood normal.         Behavior: Behavior normal.         LAB RESULTS  Results for orders placed or performed during the hospital encounter of 07/18/25   Urinalysis With Culture If Indicated - Urine, Clean Catch    Collection Time: 07/18/25  7:13 PM    Specimen: Urine, Clean Catch   Result Value Ref Range    Color, UA Dark Yellow (A) Yellow, Straw    Appearance, UA Cloudy (A) Clear    pH, UA 5.5 5.0 - 8.0    Specific Gravity, UA 1.028 1.005 - 1.030    Glucose, UA Negative Negative    Ketones, UA 40 mg/dL (2+) (A) Negative    Bilirubin, UA Negative Negative    Blood, UA Negative Negative    Protein, UA 30 mg/dL (1+) (A) Negative    Leuk Esterase, UA Moderate (2+) (A) Negative    Nitrite, UA Negative Negative    Urobilinogen, UA 1.0 E.U./dL 0.2 - 1.0 E.U./dL   Urinalysis, Microscopic Only - Urine, Clean Catch    Collection Time: 07/18/25  7:13 PM    Specimen: Urine, Clean Catch   Result Value Ref Range    RBC, UA 3-5 (A) None Seen, 0-2 /HPF    WBC, UA 11-20 (A) None  Seen, 0-2 /HPF    Bacteria, UA 1+ (A) None Seen /HPF    Squamous Epithelial Cells, UA 13-20 (A) None Seen, 0-2 /HPF    Hyaline Casts, UA Too Numerous to Count None Seen /LPF    Mucus, UA Small/1+ (A) None Seen, Trace /HPF    Methodology Manual Light Microscopy    POCT, urine preg    Collection Time: 07/18/25  7:14 PM    Specimen: Urine   Result Value Ref Range    HCG, Urine, QL Negative     Lot Number [id number]     Internal Positive Control Positive     Internal Negative Control Negative     Expiration Date 10/17/2026        If labs were ordered, I independently reviewed the results and considered them in treating the patient.    RADIOLOGY  No orders to display     [] Radiologist's Report Reviewed:  I ordered and independently interpreted the above noted radiographic studies.  See radiologist's dictation for official interpretation.      PROCEDURES    Procedures    No orders to display       MEDICATIONS GIVEN IN ER    Medications   cefuroxime (CEFTIN) tablet 500 mg (has no administration in time range)   phenazopyridine (PYRIDIUM) tablet 200 mg (has no administration in time range)       MEDICAL DECISION MAKING, PROGRESS, and CONSULTS   Medical Decision Making  30-year-old nontoxic-appearing female presented to the ED with complaints of dysuria and lower pelvic pain. She was recently evaluated outpatient by her primary care provider, where urine cultures confirmed group B streptococcus. Prior evaluation at [facility], including labs, urinalysis, and CT, showed no acute findings beyond a urinary tract infection. Her primary care provider referred her to the ED for further management. On examination, no acute emergent findings were noted. Today's labs revealed urinalysis consistent with an infectious process. After consultation with ED pharmacy, a broad-spectrum cephalosporin was recommended. The patient was treated with cefuroxime in the ED and prescribed cefuroxime for outpatient continuation. Pyridium was  also prescribed for UTI-related discomfort. She was educated on symptomatic care and instructed to follow up with her primary care provider. The patient was discharged in stable condition with return precautions provided.    Problems Addressed:  Acute UTI (urinary tract infection): complicated acute illness or injury  Group B streptococcal bacteriuria: complicated acute illness or injury    Amount and/or Complexity of Data Reviewed  External Data Reviewed: notes.  Labs: ordered. Decision-making details documented in ED Course.    Risk  Prescription drug management.        Discussion below represents my analysis of pertinent findings related to patient's condition, differential diagnosis, treatment plan and final disposition.    Assessment includes with Differential diagnosis including but is not limited to:   UTI, bacteremia, sepsis    Additional sources  Discussed/ obtained information from independent historians:   [] Spouse  [] Parent  [] Family member  [] Friend  [] EMS   [] Other:  External (non-ED) record review:   [] Inpatient record:   [] Office record:   [] Outpatient record:   [] Prior Outpatient labs:   [] Prior Outpatient radiology:   [] Primary Care record:   [x] Outside ED record: Per review of patient's history she was initiated on Cipro 500; CT scan of the abdomen pelvis demonstrated no acute findings   [] Other:   Patient's care impacted by:   [] Diabetes  [] Hypertension  [] Hyperlipidemia  [] Hypothyroidism   [] Coronary Artery Disease  [] Congestive Heart Failure   [] COPD   [] Cancer   [] Obesity  [] GERD   [] Tobacco Abuse   [] Substance Abuse    [x] Anxiety   [x] Depression   [] Other:   Care significantly affected by Social Determinants of Health (housing and economic circumstances, unemployment)    [] Yes     [x] No   If yes, Patient's care significantly limited by  Social Determinants of Health including:   [] Inadequate housing   [] Low income   [] Alcoholism and drug addiction in  family   [] Problems related to primary support group   [] Unemployment   [] Problems related to employment   [] Other Social Determinants of Health:     Orders placed during this visit:  Orders Placed This Encounter   Procedures    Urine Culture - Urine,    Urinalysis With Culture If Indicated - Urine, Clean Catch    Urinalysis, Microscopic Only - Urine, Clean Catch    POCT, urine preg   I considered prescription management  with:   [] Pain medication  [] Antiviral  [x] Antibiotic: Implemented as prescribed for UTI   [] Other:   Rationale:  ED Course:    ED Course as of 07/18/25 2019 Fri Jul 18, 2025   1907 Vitals and Telemetry tracing was reviewed and directly interpreted by myself demonstrating blood pressure 183/109, temperature 98.8 °F, heart rate of 91, respirations 18/min oxygen saturation 97% on room air [JG]   1908 BP(!): 183/109 [JG]   1908 Temp: 98.8 °F (37.1 °C) [JG]   1908 Heart Rate: 91 [JG]   1908 Resp: 18 [JG]   1908 SpO2: 97 % [JG]   2015 UA reviewed and directly interpreted by myself and demonstrated findings consistent with infectious process.  In the setting of recent group B strep positive urine culture we will treat accordingly. [JG]      ED Course User Index  [JG] Eben Lundberg, PA            DIAGNOSIS  Final diagnoses:   Acute UTI (urinary tract infection)   Group B streptococcal bacteriuria     DISPOSITION    ED Disposition       ED Disposition   Discharge    Condition   Stable    Comment   --           DISCHARGE    Patient discharged in stable condition.    Reviewed implications of results, diagnosis, meds, responsibility to follow up, warning signs and symptoms of possible worsening, potential complications and reasons to return to ER.    Patient/Family voiced understanding of above instructions.    Discussed plan for discharge, as there is no emergent indication for admission.  Pt/family is agreeable and understands need for follow up and possible repeat testing.  Pt/family is aware  that discharge does not mean that nothing is wrong but that it indicates no emergency is currently present that requires admission and they must continue care with follow-up as given below or with a physician of their choice.     FOLLOW-UP  Darcie Aragon APRN  [address] [place] [id number] [phone]    Call   Call for follow up with primary care    [facility] EMERGENCY DEPARTMENT  [address] [address] [phone]  Go to   If symptoms worsen         Medication List        New Prescriptions      cefuroxime 500 MG tablet  Commonly known as: CEFTIN  Take 1 tablet by mouth 2 (Two) Times a Day for 5 days.     phenazopyridine 200 MG tablet  Commonly known as: PYRIDIUM  Take 1 tablet by mouth 3 (Three) Times a Day As Needed for Bladder Spasms or Dysuria for up to 2 days.               Where to Get Your Medications        These medications were sent to [facility]/pharmacy #[id number] - [place], [place] - [address] AT [facility] - [phone]  - [phone]   [address] [id number]      Phone: [phone]   cefuroxime 500 MG tablet  phenazopyridine 200 MG tablet           Eben Lundberg PA  07/18/25 2019

## 2025-07-20 LAB — BACTERIA SPEC AEROBE CULT: NO GROWTH
